# Patient Record
Sex: MALE | Race: WHITE | Employment: UNEMPLOYED | URBAN - NONMETROPOLITAN AREA
[De-identification: names, ages, dates, MRNs, and addresses within clinical notes are randomized per-mention and may not be internally consistent; named-entity substitution may affect disease eponyms.]

---

## 2019-05-15 ENCOUNTER — OFFICE VISIT (OUTPATIENT)
Dept: SURGERY | Age: 49
End: 2019-05-15
Payer: COMMERCIAL

## 2019-05-15 VITALS — BODY MASS INDEX: 22.82 KG/M2 | WEIGHT: 173 LBS

## 2019-05-15 DIAGNOSIS — L02.214 ABSCESS OF GROIN, LEFT: Primary | ICD-10-CM

## 2019-05-15 PROCEDURE — 10060 I&D ABSCESS SIMPLE/SINGLE: CPT | Performed by: SURGERY

## 2019-05-15 RX ORDER — DOXYCYCLINE HYCLATE 100 MG
100 TABLET ORAL 2 TIMES DAILY
Qty: 14 TABLET | Refills: 0 | Status: SHIPPED | OUTPATIENT
Start: 2019-05-15 | End: 2019-05-22

## 2019-05-15 NOTE — PATIENT INSTRUCTIONS
Repack once a day until Sunday (5/19/19). Monday, pull packing out and keep a dry dressing on the area. SIGNS OF INFECTION  - Redness, swelling, skin hot  - Wound bed turns black or stringy yellow  - Foul odor  - Increased drainage or pus  - Increased pain  - Fever greater than 100F    CALL YOUR DOCTOR OR SEEK MEDICAL ATTENTION IF SIGNS OF INFECTION.   DO NOT WAIT UNTIL YOUR NEXT APPOINTMENT    Call the Wound Care Nurse with any other questions or concerns- [phone]

## 2019-05-15 NOTE — PROGRESS NOTES
Ian Griffin is a 53 y.o. male who presents today for evaluation of possible groin abscess. Patient states that starting Friday of last week he noticed a small swelling in his left groin along the inguinal crease that he states began \"like a pimple\". He tried to express fluid from the area while he was in the shower and did have some return of fluid but began to have some pain in the area and stopped. He went throughout the rest of his day without any big issues but was still having mild pain in the area. When he reevaluated the area later in the day he noted that it had increased in size. He again tried to express fluid from it was having some difficulty with doing this secondary to pain. Throughout the next day or 2 he states that the area continued to enlarge in size and it got as big as what he describes as golf ball size. On Sunday due to the increasing size and pain as well as some redness in the area he presented to the ER at [facility] [place] for evaluation. In the ER he was told that this was likely a cellulitis and was prescribed Keflex as well as naproxen and discharged home. On Monday he states the area continued to enlarge in size and become more painful. He did have a small scab on the top of the swelling and he was keeping the dressing over this to protect it from any secondary infection. During the day on Monday and apparently the area drains and when he got home that evening he noted there was a large amount of drainage on the dressing as well as on his underwear. At home he expressed out additional fluid and states that it was a fairly significant amounts which was running down his leg. He describes the fluid is that with some material that looked like cottage cheese as well as foul smell. He is continuing to take his antibiotic and since the large amount of drainage on Monday he denies any significant drainage since that time.   He is continued to have some pain in the area and due to concern for further infection he called general surgery today to be seen. He denies any systemic signs of fevers or chills and states that since the area drained that the redness has improved. He denies any spreading of the redness into his perineum area and he states that the pain has remained fairly consistent with no acute worsening. History reviewed. No pertinent past medical history. History reviewed. No pertinent surgical history. Current Outpatient Medications   Medication Sig Dispense Refill    doxycycline hyclate (VIBRA-TABS) 100 MG tablet Take 1 tablet by mouth 2 times daily for 7 days 14 tablet 0    ibuprofen (ADVIL;MOTRIN) 800 MG tablet Take 1 tablet by mouth every 8 hours as needed for Pain 30 tablet 0    HYDROcodone-acetaminophen (NORCO) 5-325 MG per tablet Take 1 tablet by mouth every 6 hours as needed for Pain 15 tablet 0    pseudoephedrine-guaiFENesin (MUCINEX D)  MG per tablet Take 1 tablet by mouth every 12 hours 30 tablet 0     No current facility-administered medications for this visit. No Known Allergies    History reviewed. No pertinent family history.     Social History     Socioeconomic History    Marital status: Single     Spouse name: Not on file    Number of children: Not on file    Years of education: Not on file    Highest education level: Not on file   Occupational History    Not on file   Social Needs    Financial resource strain: Not on file    Food insecurity:     Worry: Not on file     Inability: Not on file    Transportation needs:     Medical: Not on file     Non-medical: Not on file   Tobacco Use    Smoking status: Current Every Day Smoker     Packs/day: 1.00     Types: Cigarettes    Smokeless tobacco: Never Used   Substance and Sexual Activity    Alcohol use: No    Drug use: No    Sexual activity: Not on file   Lifestyle    Physical activity:     Days per week: Not on file     Minutes per session: Not on file    Stress: Not on file   Relationships    Social connections:     Talks on phone: Not on file     Gets together: Not on file     Attends Anabaptism service: Not on file     Active member of club or organization: Not on file     Attends meetings of clubs or organizations: Not on file     Relationship status: Not on file    Intimate partner violence:     Fear of current or ex partner: Not on file     Emotionally abused: Not on file     Physically abused: Not on file     Forced sexual activity: Not on file   Other Topics Concern    Not on file   Social History Narrative    Not on file       ROS:   Review of Systems - General ROS: negative for - chills, fever or weight loss  Psychological ROS: negative  Ophthalmic ROS: negative  ENT ROS: negative  Allergy and Immunology ROS: negative  Hematological and Lymphatic ROS: negative  Endocrine ROS: negative  Respiratory ROS: no cough, shortness of breath, or wheezing  Cardiovascular ROS: no chest pain or dyspnea on exertion  Gastrointestinal ROS: no abdominal pain, change in bowel habits, or black or bloody stools  Genito-Urinary ROS: no dysuria, trouble voiding, or hematuria  Musculoskeletal ROS: negative      Objective   There were no vitals filed for this visit. General:in no apparent distress, well developed and well nourished, alert and oriented times 3  Eyes: PERRL, EOMI and Sclera nonicteric  Ears, Nose, Throat: external ear and ear canal normal bilaterally, oropharynx clear and moist with normal mucous membranes  Neck: neck supple and non tender without mass  Lungs: clear to auscultation without wheezes or rales   Heart: S1S2, no mumurs, RRR  Abdomen: soft, nontender, no HSM, no guarding, no rebound, no masses  Extremity: negative  Skin:  At the left groin along the inguinal crease just 5-6 cm medial to the ASIS and slightly inferior there is a area of swelling with tissue induration present. Some erythema is noted.   There is no significant fluctuance of the area and there is noted to be a skin defect where the abscesses previously drained with some exudative material covering the os of the opening. There is no redness extending down into the perineum, no scrotal involvement and the area of induration measures approximately 6 cm x 3 cm. Neuro: CN II-XII grossly intact      Assessment     3  81-year-old male with subcutaneous abscess of the left groin. It does seem that the abscess spontaneously drained on Monday and there is been no significant reaccumulation per patient report since that time. Plan     1. Due to his concern for any retained loculations I will perform bedside I&D today to explore for any residual fluid or loculation. Risks of the procedure including bleeding, infection, scarring, pain, damage to surrounding structures, potential need for further surgery, recurrence of abscess and anesthesia risks were explained and written informed consent is obtained  2. Patient will perform packing to the abscess cavity daily with half-inch iodoform gauze with dry dressing to cover. 3.  I'm going to switch the patient from Keflex to doxycycline to brought in coverage to make sure that we cover the common bacterial species that may have caused the abscess and since he has continued to have some inflammation and erythema while on Keflex  4. Patient will follow-up in one week for wound recheck or sooner if needed. I did discuss with the patient today that should there be any signs of worsening infection spreading of the infection or systemic signs of infection including fevers or chills that he should call the office or come to the ER immediately. I also discussed with him the common signs and symptoms of Liu's gangrene and it discussed with him that should any of these occur that he should come to the ER immediately.     Electronically signed by Kamilla Martinez DO on 5/15/2019 at 12:17 PM     PROCEDURE NOTE    DATE OF PROCEDURE: 5/15/2019     SURGEON: Amol Humphreys Zuleyma Maza    PREOPERATIVE DIAGNOSIS : Left groin subcutaneous abscess    POSTOPERATIVE DIAGNOSIS: Same     OPERATION: Incision and drainage of left groin abscess    ANESTHESIA: Local with 1% lidocaine with epinephrine    ESTIMATED BLOOD LOSS:  5 ML    COMPLICATIONS: None. SPECIMENS:  None      HISTORY: The patient is a 53y.o. year old male with history of above preop diagnosis. The risk, benefits, expected outcome, and alternatives to the procedure were explained to the patient's understanding and written informed consent was obtained. OPERATIVE DETAIL:  The patient placed in supine position. Timeout was performed verifying correct patient, position, equipment and procedure to be performed. The area was prepped and draped in the usual sterile fashion. The skin and subcutaneous tissue were infiltrated with 1% lidocaine with epinephrine. Incision was made in linear fashion for a total length of approximately 1.5 cm. There was small return of purulent fluid. The wound was explored for loculations using hemostat, and none were noted. . The wound was then irrigated with saline. Hemostasis was achieved and maintained with direct compression. The wound was packed with iodoform gauze and dressed with a sterile dressing and sterile dressings were applied. The patient tolerated this procedure well without complication. All sponge and needle counts were correct at the end of the case.     Electronically signed by Radha Bella DO on 5/15/2019 at 12:26 PM

## 2019-05-16 ENCOUNTER — TELEPHONE (OUTPATIENT)
Dept: SURGERY | Age: 49
End: 2019-05-16

## 2019-05-16 NOTE — TELEPHONE ENCOUNTER
Wife called and said  went to work today and felt like incision was pulling when he lifted. Pt had I&D groin yesterday. Would like work slip for today and tomorrow. Checked with Dr. hSelly Hawley and he okayed work slip.  Wife notified can  today or tomorrow